# Patient Record
(demographics unavailable — no encounter records)

---

## 2025-06-13 NOTE — PLAN
[FreeTextEntry1] : Abdominal pain - US abdomen reviewed from last year was unremarkable, will start famotidine and advise GI evaluation. We discussed when to seek emergent care. Asthma - controlled, c/w albuterol prn Screening for metabolic conditions - will check labs HMT - UTD

## 2025-06-13 NOTE — HISTORY OF PRESENT ILLNESS
[Parent] : parent [FreeTextEntry1] : follow-up, abdominal pain [de-identified] : 23 year old male here for a f/u visit with complaints of abdominal pain for the last month.  The pain is on and off.  There are no known triggers.  At times the pain can be debilitation.   At its maximum intensity, the pain can be 8/10.   It is associated with nausea and he has had blood in his stool a few times as well.  Currently he denies any chest pain, palpitations or shortness of breath.

## 2025-06-13 NOTE — REVIEW OF SYSTEMS
[Fatigue] : fatigue [Abdominal Pain] : abdominal pain [Nausea] : nausea [Constipation] : constipation [Diarrhea] : diarrhea [Headache] : headache [Fever] : no fever [Night Sweats] : no night sweats [Discharge] : no discharge [Vision Problems] : no vision problems [Earache] : no earache [Nasal Discharge] : no nasal discharge [Chest Pain] : no chest pain [Orthopena] : no orthopnea [Shortness Of Breath] : no shortness of breath [Dysuria] : no dysuria [Hematuria] : no hematuria [Joint Pain] : no joint pain [Back Pain] : no back pain [Itching] : no itching [Skin Rash] : no skin rash [Memory Loss] : no memory loss [Suicidal] : not suicidal [Easy Bleeding] : no easy bleeding

## 2025-06-13 NOTE — HISTORY OF PRESENT ILLNESS
[Parent] : parent [FreeTextEntry1] : follow-up, abdominal pain [de-identified] : 23 year old male here for a f/u visit with complaints of abdominal pain for the last month.  The pain is on and off.  There are no known triggers.  At times the pain can be debilitation.   At its maximum intensity, the pain can be 8/10.   It is associated with nausea and he has had blood in his stool a few times as well.  Currently he denies any chest pain, palpitations or shortness of breath.

## 2025-07-15 NOTE — HISTORY OF PRESENT ILLNESS
[FreeTextEntry1] : 5/2024: 23 y/o male presents today with acute diarrhea and vomiting with intermittent abdominal pain that started last week. Hx asthma. Father noted at bedside. Patient notes that other family members in his household have been sick recently too, with same symptoms. Patient states that abdominal pain is relieved by diarrhea and vomiting. Patient denies any fevers, sob, rectal bleeding and acid reflux. Patient went to ER Tuesday due to abdominal pain, abdominal US normal. No family history of colon cancer.   RTO 6/16/25 for stomach discomfort, nausea and irregular BMs. Mother noted at bedside. Reviewed negative stool test results from 6/2024. Patient complains of nausea every day and decreased appetite, weight loss. BMs alternate diarrhea/constipation, occasional rectal bleeding. Currently taking famotidine 20 mg daily with minimal relief. Reviewed recent lab work performed 6/12/25. Discussed EGD/colonoscopy, patient agreeable to EGD. Will schedule EGD. Follow up results with NP 2-3 months.    RTO 7/15/25- s/p egd yesterday; had some mild sub xiphoid pain after procedure. Able to eat and sleep no fever. Using only pepcid. No n/v. [de-identified] : 5/2024: Normal abdominal US.

## 2025-07-15 NOTE — REVIEW OF SYSTEMS
[Recent Weight Loss (___ Lbs)] : recent [unfilled] ~Ulb weight loss [As Noted in HPI] : as noted in HPI [Negative] : Heme/Lymph [Vomiting] : no vomiting [Constipation] : no constipation [Diarrhea] : no diarrhea

## 2025-07-15 NOTE — REASON FOR VISIT
[Follow-up] : a follow-up of an existing diagnosis [Parent] : parent [FreeTextEntry1] : epigastric pain

## 2025-07-15 NOTE — ASSESSMENT
[FreeTextEntry1] : 5/2024: 21 y/o male presents today with acute diarrhea and vomiting with intermittent abdominal pain that started last week. Hx asthma. Father noted at bedside. Patient notes that other family members in his household have been sick recently too, with same symptoms. Patient states that abdominal pain is relieved by diarrhea and vomiting. Patient denies any fevers, sob, rectal bleeding and acid reflux. Patient went to ER Tuesday due to abdominal pain, abdominal US normal. No family history of colon cancer.   RTO 6/16/25 for stomach discomfort, nausea and irregular BMs. Mother noted at bedside. Reviewed negative stool test results from 6/2024. Patient complains of nausea every day and decreased appetite, weight loss. BMs alternate diarrhea/constipation, occasional rectal bleeding. Currently taking famotidine 20 mg daily with minimal relief. Reviewed recent lab work performed 6/12/25. Discussed EGD/colonoscopy, patient agreeable to EGD. Will schedule EGD. Follow up results with NP 2-3 months.    RTO 7/15/25- s/p egd yesterday; had some mild sub xiphoid pain after procedure. Able to eat and sleep no fever. Using only pepcid. No n/v. IMP; 1. gastritis 2. discomfort likely due to bx 3. Altered bms  PLAN: 1. reassured pt 2. RTO tomorrow 3. omeprazole 40 mg po qd 4. await pathology

## 2025-07-15 NOTE — PHYSICAL EXAM
01/24/18 1358   Rehab Treatment   Discipline occupational therapist   Treatment Not Performed patient unavailable for treatment   Recommendation   OT - Next Appointment 01/25/18      [Alert] : alert [Normal Voice/Communication] : normal voice/communication [Healthy Appearing] : healthy appearing [No Acute Distress] : no acute distress [Sclera] : the sclera and conjunctiva were normal [Hearing Threshold Finger Rub Not [place]] : hearing was normal [Normal Lips/Gums] : the lips and gums were normal [Oropharynx] : the oropharynx was normal [Normal Appearance] : the appearance of the neck was normal [No Neck Mass] : no neck mass was observed [No Respiratory Distress] : no respiratory distress [No Acc Muscle Use] : no accessory muscle use [Respiration, Rhythm And Depth] : normal respiratory rhythm and effort [Auscultation Breath Sounds / Voice Sounds] : lungs were clear to auscultation bilaterally [Heart Rate And Rhythm] : heart rate was normal and rhythm regular [Normal S1, S2] : normal S1 and S2 [Murmurs] : no murmurs [Bowel Sounds] : normal bowel sounds [Abdomen Tenderness] : non-tender [No Masses] : no abdominal mass palpated [Abdomen Soft] : soft [] : no hepatosplenomegaly [Oriented To Time, Place, And Person] : oriented to person, place, and time